# Patient Record
Sex: FEMALE | Race: WHITE | Employment: PART TIME | URBAN - METROPOLITAN AREA
[De-identification: names, ages, dates, MRNs, and addresses within clinical notes are randomized per-mention and may not be internally consistent; named-entity substitution may affect disease eponyms.]

---

## 2020-10-08 ENCOUNTER — HOSPITAL ENCOUNTER (OUTPATIENT)
Dept: LAB | Facility: MEDICAL CENTER | Age: 60
End: 2020-10-08
Attending: PHYSICIAN ASSISTANT
Payer: COMMERCIAL

## 2020-10-08 LAB
COVID ORDER STATUS COVID19: NORMAL
SARS-COV-2 RNA RESP QL NAA+PROBE: NOTDETECTED
SPECIMEN SOURCE: NORMAL

## 2020-10-08 PROCEDURE — C9803 HOPD COVID-19 SPEC COLLECT: HCPCS

## 2020-10-08 PROCEDURE — U0003 INFECTIOUS AGENT DETECTION BY NUCLEIC ACID (DNA OR RNA); SEVERE ACUTE RESPIRATORY SYNDROME CORONAVIRUS 2 (SARS-COV-2) (CORONAVIRUS DISEASE [COVID-19]), AMPLIFIED PROBE TECHNIQUE, MAKING USE OF HIGH THROUGHPUT TECHNOLOGIES AS DESCRIBED BY CMS-2020-01-R: HCPCS

## 2020-11-11 ENCOUNTER — HOSPITAL ENCOUNTER (OUTPATIENT)
Dept: LAB | Facility: MEDICAL CENTER | Age: 60
End: 2020-11-11
Attending: FAMILY MEDICINE
Payer: COMMERCIAL

## 2020-11-11 PROCEDURE — U0003 INFECTIOUS AGENT DETECTION BY NUCLEIC ACID (DNA OR RNA); SEVERE ACUTE RESPIRATORY SYNDROME CORONAVIRUS 2 (SARS-COV-2) (CORONAVIRUS DISEASE [COVID-19]), AMPLIFIED PROBE TECHNIQUE, MAKING USE OF HIGH THROUGHPUT TECHNOLOGIES AS DESCRIBED BY CMS-2020-01-R: HCPCS

## 2020-11-11 PROCEDURE — C9803 HOPD COVID-19 SPEC COLLECT: HCPCS

## 2020-11-12 LAB — COVID ORDER STATUS COVID19: NORMAL

## 2020-11-13 LAB
SARS-COV-2 RNA RESP QL NAA+PROBE: NOTDETECTED
SPECIMEN SOURCE: NORMAL

## 2020-12-18 ENCOUNTER — HOSPITAL ENCOUNTER (OUTPATIENT)
Dept: LAB | Facility: MEDICAL CENTER | Age: 60
End: 2020-12-18
Attending: FAMILY MEDICINE
Payer: COMMERCIAL

## 2020-12-18 PROCEDURE — U0003 INFECTIOUS AGENT DETECTION BY NUCLEIC ACID (DNA OR RNA); SEVERE ACUTE RESPIRATORY SYNDROME CORONAVIRUS 2 (SARS-COV-2) (CORONAVIRUS DISEASE [COVID-19]), AMPLIFIED PROBE TECHNIQUE, MAKING USE OF HIGH THROUGHPUT TECHNOLOGIES AS DESCRIBED BY CMS-2020-01-R: HCPCS

## 2020-12-18 PROCEDURE — C9803 HOPD COVID-19 SPEC COLLECT: HCPCS

## 2021-05-03 ENCOUNTER — HOSPITAL ENCOUNTER (OUTPATIENT)
Dept: RADIOLOGY | Facility: MEDICAL CENTER | Age: 61
End: 2021-05-03
Attending: FAMILY MEDICINE
Payer: COMMERCIAL

## 2021-05-03 DIAGNOSIS — M79.661 PAIN OF RIGHT LOWER LEG: ICD-10-CM

## 2021-05-03 DIAGNOSIS — R22.41 KNEE MASS, RIGHT: ICD-10-CM

## 2021-05-03 PROCEDURE — 93971 EXTREMITY STUDY: CPT | Mod: RT

## 2021-05-17 ENCOUNTER — HOSPITAL ENCOUNTER (OUTPATIENT)
Dept: LAB | Facility: MEDICAL CENTER | Age: 61
End: 2021-05-17
Attending: FAMILY MEDICINE
Payer: COMMERCIAL

## 2021-05-17 LAB
ALBUMIN SERPL BCP-MCNC: 4.3 G/DL (ref 3.2–4.9)
ALBUMIN/GLOB SERPL: 1.2 G/DL
ALP SERPL-CCNC: 113 U/L (ref 30–99)
ALT SERPL-CCNC: 18 U/L (ref 2–50)
ANION GAP SERPL CALC-SCNC: 9 MMOL/L (ref 7–16)
APPEARANCE UR: CLEAR
AST SERPL-CCNC: 20 U/L (ref 12–45)
BASOPHILS # BLD AUTO: 0.9 % (ref 0–1.8)
BASOPHILS # BLD: 0.07 K/UL (ref 0–0.12)
BILIRUB SERPL-MCNC: 0.3 MG/DL (ref 0.1–1.5)
BILIRUB UR QL STRIP.AUTO: NEGATIVE
BUN SERPL-MCNC: 12 MG/DL (ref 8–22)
CALCIUM SERPL-MCNC: 10 MG/DL (ref 8.5–10.5)
CHLORIDE SERPL-SCNC: 106 MMOL/L (ref 96–112)
CHOLEST SERPL-MCNC: 186 MG/DL (ref 100–199)
CO2 SERPL-SCNC: 27 MMOL/L (ref 20–33)
COLOR UR: YELLOW
CREAT SERPL-MCNC: 0.84 MG/DL (ref 0.5–1.4)
EOSINOPHIL # BLD AUTO: 0.19 K/UL (ref 0–0.51)
EOSINOPHIL NFR BLD: 2.3 % (ref 0–6.9)
ERYTHROCYTE [DISTWIDTH] IN BLOOD BY AUTOMATED COUNT: 44.2 FL (ref 35.9–50)
EST. AVERAGE GLUCOSE BLD GHB EST-MCNC: 128 MG/DL
FASTING STATUS PATIENT QL REPORTED: NORMAL
GLOBULIN SER CALC-MCNC: 3.5 G/DL (ref 1.9–3.5)
GLUCOSE SERPL-MCNC: 108 MG/DL (ref 65–99)
GLUCOSE UR STRIP.AUTO-MCNC: NEGATIVE MG/DL
HBA1C MFR BLD: 6.1 % (ref 4–5.6)
HCT VFR BLD AUTO: 48.1 % (ref 37–47)
HDLC SERPL-MCNC: 59 MG/DL
HGB BLD-MCNC: 15.6 G/DL (ref 12–16)
IMM GRANULOCYTES # BLD AUTO: 0.02 K/UL (ref 0–0.11)
IMM GRANULOCYTES NFR BLD AUTO: 0.2 % (ref 0–0.9)
KETONES UR STRIP.AUTO-MCNC: NEGATIVE MG/DL
LDLC SERPL CALC-MCNC: 106 MG/DL
LEUKOCYTE ESTERASE UR QL STRIP.AUTO: NEGATIVE
LYMPHOCYTES # BLD AUTO: 2.04 K/UL (ref 1–4.8)
LYMPHOCYTES NFR BLD: 24.9 % (ref 22–41)
MAGNESIUM SERPL-MCNC: 2.3 MG/DL (ref 1.5–2.5)
MCH RBC QN AUTO: 29.7 PG (ref 27–33)
MCHC RBC AUTO-ENTMCNC: 32.4 G/DL (ref 33.6–35)
MCV RBC AUTO: 91.6 FL (ref 81.4–97.8)
MICRO URNS: NORMAL
MONOCYTES # BLD AUTO: 0.49 K/UL (ref 0–0.85)
MONOCYTES NFR BLD AUTO: 6 % (ref 0–13.4)
NEUTROPHILS # BLD AUTO: 5.37 K/UL (ref 2–7.15)
NEUTROPHILS NFR BLD: 65.7 % (ref 44–72)
NITRITE UR QL STRIP.AUTO: NEGATIVE
NRBC # BLD AUTO: 0 K/UL
NRBC BLD-RTO: 0 /100 WBC
PH UR STRIP.AUTO: 5.5 [PH] (ref 5–8)
PLATELET # BLD AUTO: 292 K/UL (ref 164–446)
PMV BLD AUTO: 10.2 FL (ref 9–12.9)
POTASSIUM SERPL-SCNC: 4.4 MMOL/L (ref 3.6–5.5)
PROT SERPL-MCNC: 7.8 G/DL (ref 6–8.2)
PROT UR QL STRIP: NEGATIVE MG/DL
RBC # BLD AUTO: 5.25 M/UL (ref 4.2–5.4)
RBC UR QL AUTO: NEGATIVE
SODIUM SERPL-SCNC: 142 MMOL/L (ref 135–145)
SP GR UR STRIP.AUTO: 1.02
T3FREE SERPL-MCNC: 3.21 PG/ML (ref 2–4.4)
T4 FREE SERPL-MCNC: 1.15 NG/DL (ref 0.93–1.7)
TRIGL SERPL-MCNC: 107 MG/DL (ref 0–149)
TSH SERPL DL<=0.005 MIU/L-ACNC: 2.55 UIU/ML (ref 0.38–5.33)
UROBILINOGEN UR STRIP.AUTO-MCNC: 0.2 MG/DL
WBC # BLD AUTO: 8.2 K/UL (ref 4.8–10.8)

## 2021-05-17 PROCEDURE — 36415 COLL VENOUS BLD VENIPUNCTURE: CPT

## 2021-05-17 PROCEDURE — 80053 COMPREHEN METABOLIC PANEL: CPT

## 2021-05-17 PROCEDURE — 80061 LIPID PANEL: CPT

## 2021-05-17 PROCEDURE — 85025 COMPLETE CBC W/AUTO DIFF WBC: CPT

## 2021-05-17 PROCEDURE — 84439 ASSAY OF FREE THYROXINE: CPT

## 2021-05-17 PROCEDURE — 83036 HEMOGLOBIN GLYCOSYLATED A1C: CPT

## 2021-05-17 PROCEDURE — 81003 URINALYSIS AUTO W/O SCOPE: CPT

## 2021-05-17 PROCEDURE — 84443 ASSAY THYROID STIM HORMONE: CPT

## 2021-05-17 PROCEDURE — 84481 FREE ASSAY (FT-3): CPT

## 2021-05-17 PROCEDURE — 83735 ASSAY OF MAGNESIUM: CPT

## 2021-09-23 ENCOUNTER — HOSPITAL ENCOUNTER (OUTPATIENT)
Dept: LAB | Facility: MEDICAL CENTER | Age: 61
End: 2021-09-23
Attending: FAMILY MEDICINE
Payer: COMMERCIAL

## 2021-09-23 LAB — COVID ORDER STATUS COVID19: NORMAL

## 2021-09-23 PROCEDURE — C9803 HOPD COVID-19 SPEC COLLECT: HCPCS

## 2021-09-23 PROCEDURE — U0005 INFEC AGEN DETEC AMPLI PROBE: HCPCS

## 2021-09-23 PROCEDURE — U0003 INFECTIOUS AGENT DETECTION BY NUCLEIC ACID (DNA OR RNA); SEVERE ACUTE RESPIRATORY SYNDROME CORONAVIRUS 2 (SARS-COV-2) (CORONAVIRUS DISEASE [COVID-19]), AMPLIFIED PROBE TECHNIQUE, MAKING USE OF HIGH THROUGHPUT TECHNOLOGIES AS DESCRIBED BY CMS-2020-01-R: HCPCS

## 2021-09-24 LAB
SARS-COV-2 RNA RESP QL NAA+PROBE: DETECTED
SPECIMEN SOURCE: ABNORMAL

## 2022-07-05 ENCOUNTER — PATIENT MESSAGE (OUTPATIENT)
Dept: HEALTH INFORMATION MANAGEMENT | Facility: OTHER | Age: 62
End: 2022-07-05

## 2022-10-21 ENCOUNTER — OFFICE VISIT (OUTPATIENT)
Dept: URGENT CARE | Facility: CLINIC | Age: 62
End: 2022-10-21
Payer: COMMERCIAL

## 2022-10-21 VITALS
DIASTOLIC BLOOD PRESSURE: 82 MMHG | RESPIRATION RATE: 14 BRPM | WEIGHT: 183.1 LBS | OXYGEN SATURATION: 96 % | BODY MASS INDEX: 34.57 KG/M2 | TEMPERATURE: 97.4 F | SYSTOLIC BLOOD PRESSURE: 116 MMHG | HEIGHT: 61 IN | HEART RATE: 108 BPM

## 2022-10-21 DIAGNOSIS — R05.1 ACUTE COUGH: ICD-10-CM

## 2022-10-21 DIAGNOSIS — J01.10 ACUTE FRONTAL SINUSITIS, RECURRENCE NOT SPECIFIED: ICD-10-CM

## 2022-10-21 PROCEDURE — 99204 OFFICE O/P NEW MOD 45 MIN: CPT | Performed by: NURSE PRACTITIONER

## 2022-10-21 RX ORDER — DOXYCYCLINE HYCLATE 100 MG
100 TABLET ORAL 2 TIMES DAILY
Qty: 14 TABLET | Refills: 0 | Status: SHIPPED | OUTPATIENT
Start: 2022-10-21 | End: 2022-10-28

## 2022-10-21 RX ORDER — DOXYCYCLINE HYCLATE 20 MG
20 TABLET ORAL 2 TIMES DAILY
COMMUNITY
Start: 2022-08-23 | End: 2022-10-21

## 2022-10-21 RX ORDER — BENZONATATE 100 MG/1
100 CAPSULE ORAL 3 TIMES DAILY PRN
Qty: 60 CAPSULE | Refills: 0 | Status: SHIPPED | OUTPATIENT
Start: 2022-10-21 | End: 2024-01-11

## 2022-10-21 ASSESSMENT — FIBROSIS 4 INDEX: FIB4 SCORE: 1.03

## 2022-10-21 NOTE — PROGRESS NOTES
Chief Complaint   Patient presents with    Cough     X 3 weeks, coughing, SOB, sore throat, congestion, runny nose, rt ear pain       HISTORY OF PRESENT ILLNESS: Patient is a pleasant 62 y.o. female who presents today due to three weeks of nasal congestion, cough, headache, and sinus pressure.  She reports right ear pain today and also notes initial fever which is since cleared.  She denies associated difficulty breathing, confusion, nausea, vomiting or diarrhea.  She has tried OTC cold/sinus medication at home without much improvement.  Admits a history of sinus infections in the past, this feels similar.  She took 2 weeks of a cephalosporin with some improvement but not full resolve.      There are no problems to display for this patient.      Allergies:Aspirin, Epinephrine, and Penicillin g    Current Outpatient Medications Ordered in Epic   Medication Sig Dispense Refill    benzonatate (TESSALON) 100 MG Cap Take 1 Capsule by mouth 3 times a day as needed for Cough. 60 Capsule 0    doxycycline (VIBRAMYCIN) 100 MG Tab Take 1 Tablet by mouth 2 times a day for 7 days. 14 Tablet 0     No current Epic-ordered facility-administered medications on file.       History reviewed. No pertinent past medical history.    Social History     Tobacco Use    Smoking status: Never    Smokeless tobacco: Never   Vaping Use    Vaping Use: Never used   Substance Use Topics    Alcohol use: Not Currently     Comment: occ    Drug use: Never       No family status information on file.   History reviewed. No pertinent family history.    ROS:  Review of Systems   Constitutional: Positive for initial fever, chills, fatigue. Negative for weight loss.   HENT: Positive for sinus pressure, sore throat, nasal congestion.  Positive for right ear pain today.  Negative for nosebleeds, neck pain.    Eyes: Negative for vision changes.   Neuro: Positive for headache. Negative for sensory changes, weakness, seizure, LOC.  Cardiovascular: Negative for  "chest pain, palpitations, orthopnea and leg swelling.   Respiratory: Positive for cough, sputum production.   Negative for shortness of breath and wheezing.   Gastrointestinal: Negative for abdominal pain, nausea, vomiting or diarrhea.    Skin: Negative for rash, diaphoresis.     Exam:  /82 (BP Location: Left arm, Patient Position: Sitting, BP Cuff Size: Large adult long)   Pulse (!) 108   Temp 36.3 °C (97.4 °F) (Temporal)   Resp 14   Ht 1.549 m (5' 1\")   Wt 83.1 kg (183 lb 1.6 oz)   SpO2 96%   General: well-nourished, well-developed female in NAD  Head: normocephalic, atraumatic  Eyes: PERRLA, no conjunctival injection, acuity grossly intact, lids normal.  Ears: normal shape and symmetry, no tenderness, no discharge. External canals are without any significant edema or erythema. Tympanic membranes are without any inflammation, no effusion.  Scant amount of cerumen impaction noted right side.  Gross auditory acuity is intact.  Nose: symmetrical without tenderness, erythema and swelling noted bilateral turbinates, clear discharge.  Frontal sinus tenderness.   Mouth/Throat: reasonable hygiene, no exudates or tonsillar enlargement. Erythema is present.   Neck: no masses, range of motion within normal limits, no tracheal deviation. No obvious thyroid enlargement.   Lymph: no cervical adenopathy. No supraclavicular adenopathy.   Neuro: alert and oriented. Cranial nerves 1-12 grossly intact. No sensory deficit.   Cardiovascular: regular rate and rhythm. No edema.  Pulmonary: no distress. Chest is symmetrical with respiration, no wheezes, crackles, or rhonchi.   Musculoskeletal: no clubbing, appropriate muscle tone, gait is stable.  Skin: warm, dry, intact, no clubbing, no cyanosis, no rashes.   Psych: appropriate mood, affect, judgement.         Assessment/Plan:  1. Acute frontal sinusitis, recurrence not specified  doxycycline (VIBRAMYCIN) 100 MG Tab      2. Acute cough  benzonatate (TESSALON) 100 MG Cap    "           Doxy as directed for suspected sinusitis, questionable recurrent, potential side effects of medication discussed. Flonase as directed.  Tessalon as needed for cough.  Sleep with HOB elevated, humidifier at night, rest, increase fluid intake.   Supportive care, differential diagnoses, and indications for immediate follow-up discussed with patient.   Pathogenesis of diagnosis discussed including typical length and natural progression.   Instructed to return to clinic or nearest emergency department for any change in condition, further concerns, or worsening of symptoms.  Patient states understanding of the plan of care and discharge instructions.  Instructed to make an appointment, for follow up, with her primary care provider.        Please note that this dictation was created using voice recognition software. I have made every reasonable attempt to correct obvious errors, but I expect that there are errors of grammar and possibly content that I did not discover before finalizing the note.       KATHY Mckee.

## 2022-11-18 ENCOUNTER — OFFICE VISIT (OUTPATIENT)
Dept: URGENT CARE | Facility: CLINIC | Age: 62
End: 2022-11-18
Payer: COMMERCIAL

## 2022-11-18 VITALS
WEIGHT: 184 LBS | DIASTOLIC BLOOD PRESSURE: 86 MMHG | HEIGHT: 61 IN | SYSTOLIC BLOOD PRESSURE: 124 MMHG | TEMPERATURE: 98 F | RESPIRATION RATE: 16 BRPM | OXYGEN SATURATION: 100 % | HEART RATE: 79 BPM | BODY MASS INDEX: 34.74 KG/M2

## 2022-11-18 DIAGNOSIS — N30.00 ACUTE CYSTITIS WITHOUT HEMATURIA: ICD-10-CM

## 2022-11-18 LAB
APPEARANCE UR: NORMAL
BILIRUB UR STRIP-MCNC: NEGATIVE MG/DL
COLOR UR AUTO: YELLOW
GLUCOSE UR STRIP.AUTO-MCNC: NEGATIVE MG/DL
KETONES UR STRIP.AUTO-MCNC: NEGATIVE MG/DL
LEUKOCYTE ESTERASE UR QL STRIP.AUTO: NORMAL
NITRITE UR QL STRIP.AUTO: NEGATIVE
PH UR STRIP.AUTO: 5.5 [PH] (ref 5–8)
PROT UR QL STRIP: NEGATIVE MG/DL
RBC UR QL AUTO: NEGATIVE
SP GR UR STRIP.AUTO: 1.03
UROBILINOGEN UR STRIP-MCNC: 0.2 MG/DL

## 2022-11-18 PROCEDURE — 99213 OFFICE O/P EST LOW 20 MIN: CPT | Performed by: NURSE PRACTITIONER

## 2022-11-18 PROCEDURE — 81002 URINALYSIS NONAUTO W/O SCOPE: CPT | Performed by: NURSE PRACTITIONER

## 2022-11-18 RX ORDER — CEFDINIR 300 MG/1
300 CAPSULE ORAL 2 TIMES DAILY
Qty: 14 CAPSULE | Refills: 0 | Status: SHIPPED | OUTPATIENT
Start: 2022-11-18 | End: 2022-11-25

## 2022-11-18 ASSESSMENT — FIBROSIS 4 INDEX: FIB4 SCORE: 1.03

## 2022-11-18 NOTE — LETTER
November 18, 2022    To Whom It May Concern:         This is confirmation that Jazmin Schroeder attended her scheduled appointment with JOÃO Norman on 11/18/22. Please allow frequent restroom breaks.          If you have any questions please do not hesitate to call me at the phone number listed below.    Sincerely,          KATHY Norman.  [phone]

## 2022-11-19 NOTE — PROGRESS NOTES
Patient has consented to treatment and for use of patient information for treatment and billing purposes.    Date: 11/18/22     Arrival Mode: Private Vehicle    Chief Complaint:    Chief Complaint   Patient presents with    UTI     Bladder pressure/frequently urination/lower back pain/burning sensation/discomfort/x3days        History of Present Illness: 62 y.o.  female presents to clinic with 3-day history of bladder pressure urinary frequency urgency and burning with urination.  Patient also states she has been having low back pain as well.  Patient denies any vaginal symptoms possibly STI STD.  Patient does sit for long periods of time at work and is unable to hydrate and use the bathroom often.      ROS:    As stated in HPI     Pertinent Medical History:  History reviewed. No pertinent past medical history.     Pertinent Surgical History:  History reviewed. No pertinent surgical history.     Pertinent Medications:    Current Outpatient Medications on File Prior to Visit   Medication Sig Dispense Refill    benzonatate (TESSALON) 100 MG Cap Take 1 Capsule by mouth 3 times a day as needed for Cough. (Patient not taking: Reported on 11/18/2022) 60 Capsule 0     No current facility-administered medications on file prior to visit.        Allergies:    Aspirin, Epinephrine, and Penicillin g     Social History:  Social History     Tobacco Use    Smoking status: Never    Smokeless tobacco: Never   Vaping Use    Vaping Use: Never used   Substance Use Topics    Alcohol use: Not Currently     Comment: occ    Drug use: Never        No LMP recorded. (Menstrual status: Other).           Physical Exam:    Vitals:    11/18/22 1551   BP: 124/86   Pulse: 79   Resp: 16   Temp: 36.7 °C (98 °F)   SpO2: 100%             Physical Exam  Constitutional:       General: She is not in acute distress.     Appearance: Normal appearance. She is well-developed. She is not ill-appearing or toxic-appearing.   HENT:      Head: Normocephalic and  atraumatic.   Cardiovascular:      Rate and Rhythm: Normal rate and regular rhythm.      Heart sounds: Normal heart sounds.   Pulmonary:      Effort: Pulmonary effort is normal. No respiratory distress.      Breath sounds: Normal breath sounds. No wheezing.   Abdominal:      General: Abdomen is flat. Bowel sounds are normal.      Palpations: Abdomen is soft.      Tenderness: There is no abdominal tenderness. There is no right CVA tenderness, left CVA tenderness, guarding or rebound.   Skin:     General: Skin is warm.      Capillary Refill: Capillary refill takes less than 2 seconds.      Coloration: Skin is not cyanotic or pale.   Neurological:      Mental Status: She is alert and oriented to person, place, and time.      Gait: Gait is intact.   Psychiatric:         Behavior: Behavior normal. Behavior is cooperative.                Diagnostics:    Recent Results (from the past 24 hour(s))   POCT Urinalysis    Collection Time: 11/18/22  4:04 PM   Result Value Ref Range    POC Color yellow Negative    POC Appearance cler Negative    POC Leukocyte Esterase small Negative    POC Nitrites negative Negative    POC Urobiligen 0.2 Negative (0.2) mg/dL    POC Protein negative Negative mg/dL    POC Urine PH 5.5 5.0 - 8.0    POC Blood negative Negative    POC Specific Gravity 1.030 <1.005 - >1.030    POC Ketones negative Negative mg/dL    POC Bilirubin negative Negative mg/dL    POC Glucose negative Negative mg/dL        Medical Decision making and clinic course :  I personally reviewed prior external notes and test results pertinent to today's visit.   Shared decision-making was utilized with patient for treatment plan.  POCT urinalysis shows small leukocytes as well as a high specific gravity.  Did encourage rehydration.  We will treat with Omnicef at this time patient is allergic to penicillins although has tolerated cephalosporins in the past.  If symptoms or not improved within the next 2 days while on antibiotics return  to clinic for reevaluation.        The patient remained stable during the urgent care visit.    Plan:    Medication discussed included indication for use and the potential benefits and side effects.         1. Acute cystitis without hematuria    - cefdinir (OMNICEF) 300 MG Cap; Take 1 Capsule by mouth 2 times a day for 7 days.  Dispense: 14 Capsule; Refill: 0  - POCT Urinalysis         Printed education was provided regarding the aforementioned assessments.  All of the patient's questions were answered to their satisfaction at the time of discharge.    Follow up:      Patient was encouraged to monitor symptoms closely. Those signs and symptoms which would warrant concern and mandate seeking a higher level of service through the emergency department discussed at length and included in discharge papers.  Patient stated agreement and understanding of this plan of care.    Disposition:  Home in stable condition       Voice Recognition Disclaimer:  Portions of this document were created using voice recognition software. The software does have a chance of producing errors of grammar and possibly content. I have made every reasonable attempt to correct obvious errors, but there may be errors of grammar and possibly content that I did not discover before finalizing the documentation.    Dory Reese, A.P.R.N.

## 2023-01-10 ENCOUNTER — TELEPHONE (OUTPATIENT)
Dept: HEALTH INFORMATION MANAGEMENT | Facility: OTHER | Age: 63
End: 2023-01-10
Payer: COMMERCIAL

## 2023-02-16 ENCOUNTER — TELEPHONE (OUTPATIENT)
Dept: VASCULAR SURGERY | Facility: MEDICAL CENTER | Age: 63
End: 2023-02-16
Payer: MEDICARE

## 2024-01-11 ENCOUNTER — OFFICE VISIT (OUTPATIENT)
Dept: URGENT CARE | Facility: CLINIC | Age: 64
End: 2024-01-11
Payer: MEDICARE

## 2024-01-11 VITALS
TEMPERATURE: 98.1 F | WEIGHT: 178.1 LBS | BODY MASS INDEX: 33.62 KG/M2 | DIASTOLIC BLOOD PRESSURE: 82 MMHG | SYSTOLIC BLOOD PRESSURE: 122 MMHG | HEIGHT: 61 IN | HEART RATE: 90 BPM | RESPIRATION RATE: 18 BRPM | OXYGEN SATURATION: 98 %

## 2024-01-11 DIAGNOSIS — J02.9 ACUTE VIRAL PHARYNGITIS: ICD-10-CM

## 2024-01-11 DIAGNOSIS — J02.9 SORE THROAT: ICD-10-CM

## 2024-01-11 LAB — S PYO DNA SPEC NAA+PROBE: NOT DETECTED

## 2024-01-11 PROCEDURE — 87651 STREP A DNA AMP PROBE: CPT | Performed by: PHYSICIAN ASSISTANT

## 2024-01-11 PROCEDURE — 3074F SYST BP LT 130 MM HG: CPT | Performed by: PHYSICIAN ASSISTANT

## 2024-01-11 PROCEDURE — 99213 OFFICE O/P EST LOW 20 MIN: CPT | Performed by: PHYSICIAN ASSISTANT

## 2024-01-11 PROCEDURE — 3079F DIAST BP 80-89 MM HG: CPT | Performed by: PHYSICIAN ASSISTANT

## 2024-01-11 ASSESSMENT — ENCOUNTER SYMPTOMS
HOARSE VOICE: 0
ABDOMINAL PAIN: 0
HEADACHES: 1
VOMITING: 0
RESPIRATORY NEGATIVE: 1
SORE THROAT: 1
CHILLS: 0
DIARRHEA: 0
STRIDOR: 0
DIZZINESS: 0
FEVER: 0
SHORTNESS OF BREATH: 0
SWOLLEN GLANDS: 1
TROUBLE SWALLOWING: 1
NAUSEA: 0
SINUS PAIN: 0
MUSCULOSKELETAL NEGATIVE: 1
COUGH: 0
CARDIOVASCULAR NEGATIVE: 1

## 2024-01-11 NOTE — PROGRESS NOTES
Subjective     Jazmin Schroeder is a very pleasant 64 y.o. female who presents with Pharyngitis (X1day headache/nasal congestion/right ear pain/)            Pharyngitis   This is a new problem. The current episode started in the past 7 days. The problem has been gradually worsening. There has been no fever. The fever has been present for Less than 1 day. Associated symptoms include ear pain, headaches, swollen glands and trouble swallowing. Pertinent negatives include no abdominal pain, congestion, coughing, diarrhea, hoarse voice, shortness of breath, stridor or vomiting. She has had exposure to strep. She has tried NSAIDs for the symptoms. The treatment provided mild relief.       PMH:  has no past medical history on file.  MEDS: No current outpatient medications on file.  ALLERGIES:   Allergies   Allergen Reactions    Aspirin Itching    Dexamethasone Swelling     Swelling/    Epinephrine Anaphylaxis    Penicillin G Shortness of Breath and Palpitations     SURGHX: No past surgical history on file.  SOCHX:  reports that she has never smoked. She has never used smokeless tobacco. She reports current alcohol use. She reports that she does not use drugs.  FH: family history is not on file.      Review of Systems   Constitutional:  Positive for malaise/fatigue. Negative for chills and fever.   HENT:  Positive for ear pain, sore throat and trouble swallowing. Negative for congestion, hoarse voice and sinus pain.    Respiratory: Negative.  Negative for cough, shortness of breath and stridor.    Cardiovascular: Negative.    Gastrointestinal:  Negative for abdominal pain, diarrhea, nausea and vomiting.   Musculoskeletal: Negative.    Neurological:  Positive for headaches. Negative for dizziness.       Medications, Allergies, and current problem list reviewed today in Epic           Objective     /82 (BP Location: Left arm, Patient Position: Sitting)   Pulse 90   Temp 36.7 °C (98.1 °F) (Temporal)   Resp 18   Ht  "1.549 m (5' 1\")   Wt 80.8 kg (178 lb 1.6 oz)   SpO2 98%   BMI 33.65 kg/m²      Physical Exam  Vitals and nursing note reviewed.   Constitutional:       General: She is not in acute distress.     Appearance: Normal appearance. She is well-developed. She is not ill-appearing, toxic-appearing or diaphoretic.   HENT:      Head: Normocephalic and atraumatic.      Right Ear: Tympanic membrane, ear canal and external ear normal.      Left Ear: Tympanic membrane, ear canal and external ear normal.      Nose: Rhinorrhea present. No congestion.      Mouth/Throat:      Mouth: Mucous membranes are moist.      Pharynx: Posterior oropharyngeal erythema present. No oropharyngeal exudate.   Eyes:      General:         Right eye: No discharge.         Left eye: No discharge.      Conjunctiva/sclera: Conjunctivae normal.   Cardiovascular:      Rate and Rhythm: Normal rate and regular rhythm.      Pulses: Normal pulses.      Heart sounds: Normal heart sounds.   Pulmonary:      Effort: Pulmonary effort is normal. No respiratory distress.      Breath sounds: Normal breath sounds. No stridor. No wheezing, rhonchi or rales.   Musculoskeletal:      Cervical back: Normal range of motion and neck supple. No tenderness.      Right lower leg: No edema.      Left lower leg: No edema.   Lymphadenopathy:      Cervical: No cervical adenopathy.   Skin:     General: Skin is warm and dry.   Neurological:      General: No focal deficit present.      Mental Status: She is alert and oriented to person, place, and time. Mental status is at baseline.   Psychiatric:         Mood and Affect: Mood normal.         Behavior: Behavior normal.         Thought Content: Thought content normal.         Judgment: Judgment normal.                             Assessment & Plan     This is a very pleasant 64-year-old female presenting with headache, ear fullness, sore throat and rhinorrhea for 1 day.  Exposure to strep at home.  No fever, chills or bodyaches.  No " shortness of breath, chest pain, wheezing or leg swelling.  Eating and drinking normal without vomiting or diarrhea.  Patient has dental surgery planned for Monday and needs to rule out treatable infectious etiology including strep.  No pertinent past respiratory history.  Vital signs are normal.  Clear rhinorrhea with pharynx erythema noted.  No tonsillar enlargement, exudate or adenopathy noted.  Lungs are clear without wheezing, rhonchi, rales or stridor.  Remainder of exam benign/reassuring.  In clinic strep testing negative. No clinical symptoms/signs of focal bacterial infection.  Patient will be treated for self-limiting viral URI with OTC meds, conservative measures, and symptomatic relief.  ER and red flag symptoms discussed at length.  Patient declines prescription for symptomatic relief at this time, will treat with OTC meds      1. Sore throat  POCT CEPHEID GROUP A STREP - PCR      2. Acute viral pharyngitis            I personally reviewed prior external notes and test results pertinent to today's visit. Return to clinic or go to ED if symptoms worsen or persist. Red flag symptoms and indications for ED discussed at length. Patient/Parent/Guardian voices understanding.  AVS with post-visit instructions provided or given verbally.  Follow-up with your primary care provider in 3-5 days. All side effects and potential interactions of prescribed medication discussed including allergic response, GI upset, tendon injury, rash, sedation, OCP effectiveness, etc.    Please note that this dictation was created using voice recognition software. I have made every reasonable attempt to correct obvious errors, but I expect that there are errors of grammar and possibly content that I did not discover before finalizing the note.

## 2024-03-13 ENCOUNTER — OFFICE VISIT (OUTPATIENT)
Dept: URGENT CARE | Facility: CLINIC | Age: 64
End: 2024-03-13
Payer: MEDICARE

## 2024-03-13 VITALS
TEMPERATURE: 97 F | RESPIRATION RATE: 18 BRPM | DIASTOLIC BLOOD PRESSURE: 82 MMHG | OXYGEN SATURATION: 97 % | HEART RATE: 86 BPM | SYSTOLIC BLOOD PRESSURE: 130 MMHG

## 2024-03-13 DIAGNOSIS — R05.1 ACUTE COUGH: ICD-10-CM

## 2024-03-13 DIAGNOSIS — R09.81 NASAL CONGESTION: ICD-10-CM

## 2024-03-13 DIAGNOSIS — R06.2 WHEEZING: ICD-10-CM

## 2024-03-13 DIAGNOSIS — J32.9 RHINOSINUSITIS: ICD-10-CM

## 2024-03-13 LAB
FLUAV RNA SPEC QL NAA+PROBE: NEGATIVE
FLUBV RNA SPEC QL NAA+PROBE: NEGATIVE
RSV RNA SPEC QL NAA+PROBE: NEGATIVE
SARS-COV-2 RNA RESP QL NAA+PROBE: NEGATIVE

## 2024-03-13 PROCEDURE — 0241U POCT CEPHEID COV-2, FLU A/B, RSV - PCR: CPT

## 2024-03-13 PROCEDURE — 99213 OFFICE O/P EST LOW 20 MIN: CPT

## 2024-03-13 PROCEDURE — 3075F SYST BP GE 130 - 139MM HG: CPT

## 2024-03-13 PROCEDURE — 3079F DIAST BP 80-89 MM HG: CPT

## 2024-03-13 RX ORDER — ALBUTEROL SULFATE 90 UG/1
2 AEROSOL, METERED RESPIRATORY (INHALATION) EVERY 6 HOURS PRN
Qty: 8.5 G | Refills: 0 | Status: SHIPPED | OUTPATIENT
Start: 2024-03-13

## 2024-03-13 RX ORDER — DOXYCYCLINE HYCLATE 100 MG
100 TABLET ORAL 2 TIMES DAILY
Qty: 14 TABLET | Refills: 0 | Status: SHIPPED | OUTPATIENT
Start: 2024-03-13 | End: 2024-03-20

## 2024-03-13 ASSESSMENT — ENCOUNTER SYMPTOMS
CHILLS: 0
SINUS PAIN: 1
SHORTNESS OF BREATH: 0
COUGH: 1
SPUTUM PRODUCTION: 0
WHEEZING: 1
FEVER: 1

## 2024-03-13 NOTE — PROGRESS NOTES
Subjective:   Jazmin Schroeder is a 64 y.o. female who presents for Cough (Nasal and chest congestion, x 6 days )      HPI: This is a 64-year-old female who presents today for cough, congestion, ear itching, postnasal drip, and wheezing.  This is a new problem.  Patient reports that her symptoms developed 6 days ago.  She reports elevated temperatures with a Tmax of 100.  She has been taking Mucinex and ibuprofen.  She denies any underlying asthma or smoking.  She denies any known sick contacts.    Review of Systems   Constitutional:  Positive for fever. Negative for chills and malaise/fatigue.   HENT:  Positive for congestion and sinus pain.         + Postnasal drip   Respiratory:  Positive for cough and wheezing. Negative for sputum production and shortness of breath.        Medications:    No current outpatient medications on file prior to visit.     No current facility-administered medications on file prior to visit.        Allergies:   Aspirin, Dexamethasone, Epinephrine, and Penicillin g    Problem List:   There is no problem list on file for this patient.       Surgical History:  No past surgical history on file.    Past Social Hx:   Social History     Tobacco Use    Smoking status: Never    Smokeless tobacco: Never   Vaping Use    Vaping Use: Never used   Substance Use Topics    Alcohol use: Yes     Comment: occ    Drug use: Never          Problem list, medications, and allergies reviewed by myself today in Epic.     Objective:     /82   Pulse 86   Temp 36.1 °C (97 °F)   Resp 18   SpO2 97%     Physical Exam  Vitals and nursing note reviewed.   Constitutional:       General: She is not in acute distress.     Appearance: Normal appearance. She is normal weight. She is not ill-appearing, toxic-appearing or diaphoretic.   HENT:      Head: Normocephalic and atraumatic.   Cardiovascular:      Rate and Rhythm: Normal rate and regular rhythm.      Pulses: Normal pulses.      Heart sounds: Normal heart  sounds. No murmur heard.     No friction rub. No gallop.   Pulmonary:      Effort: Pulmonary effort is normal. No respiratory distress.      Breath sounds: No stridor. Wheezing present. No rhonchi or rales.      Comments: +mild wheezes  Chest:      Chest wall: No tenderness.   Musculoskeletal:      Cervical back: Neck supple. No tenderness.   Lymphadenopathy:      Cervical: No cervical adenopathy.   Skin:     General: Skin is warm and dry.      Capillary Refill: Capillary refill takes less than 2 seconds.   Neurological:      General: No focal deficit present.      Mental Status: She is alert and oriented to person, place, and time. Mental status is at baseline.      Cranial Nerves: No cranial nerve deficit.      Motor: No weakness.      Gait: Gait normal.   Psychiatric:         Mood and Affect: Mood normal.         Behavior: Behavior normal.         Thought Content: Thought content normal.         Judgment: Judgment normal.         Assessment/Plan:     Diagnosis and associated orders:   1. Nasal congestion  POCT CoV-2, Flu A/B, RSV by PCR      2. Acute cough        3. Rhinosinusitis  doxycycline (VIBRAMYCIN) 100 MG Tab      4. Wheezing  albuterol 108 (90 Base) MCG/ACT Aero Soln inhalation aerosol            Results for orders placed or performed in visit on 03/13/24   POCT CoV-2, Flu A/B, RSV by PCR   Result Value Ref Range    SARS-CoV-2 by PCR Negative Negative, Invalid    Influenza virus A RNA Negative Negative, Invalid    Influenza virus B, PCR Negative Negative, Invalid    RSV, PCR Negative Negative, Invalid       Comments/MDM:   Pt is clinically stable at today's acute urgent care visit.  No acute distress noted. Appropriate for outpatient management at this time.     Acute problem.  COVID, influenza, RSV are all negative.  Patient noted to have some slight wheezes on exam today.  Patient declines steroids today.  She will be treated empirically with course of doxycycline.  Butyryl inhaler also prescribed.  I  have recommended daily use of daily nondrowsy antihistamine such as Claritin or Zyrtec. Patient is to return to UC or go to ER for any new or worsening signs or symptoms, and follow with with PCP for recheck. Patient is agreeable with plan of care and verbalizes good understanding.             Discussed DDx, management options (risks,benefits, and alternatives to planned treatment), natural progression and supportive care.  Expressed understanding and the treatment plan was agreed upon. Questions were encouraged and answered   Return to urgent care prn if new or worsening sx or if there is no improvement in condition prn.    Educated in Red flags and indications to immediately call 911 or present to the Emergency Department.   Advised the patient to follow-up with the primary care physician for recheck, reevaluation, and consideration of further management.    I personally reviewed prior external notes and test results pertinent to today's visit.  I have independently reviewed and interpreted all diagnostics ordered during this urgent care acute visit.         Please note that this dictation was created using voice recognition software. I have made a reasonable attempt to correct obvious errors, but I expect that there are errors of grammar and possibly content that I did not discover before finalizing the note.    This note was electronically signed by MAGNOLIA Valadez

## 2024-03-25 ENCOUNTER — OFFICE VISIT (OUTPATIENT)
Dept: URGENT CARE | Facility: CLINIC | Age: 64
End: 2024-03-25
Payer: MEDICARE

## 2024-03-25 VITALS
WEIGHT: 181 LBS | TEMPERATURE: 97.5 F | HEART RATE: 84 BPM | SYSTOLIC BLOOD PRESSURE: 126 MMHG | RESPIRATION RATE: 18 BRPM | BODY MASS INDEX: 34.17 KG/M2 | OXYGEN SATURATION: 95 % | DIASTOLIC BLOOD PRESSURE: 80 MMHG | HEIGHT: 61 IN

## 2024-03-25 DIAGNOSIS — J01.90 ACUTE BACTERIAL RHINOSINUSITIS: ICD-10-CM

## 2024-03-25 DIAGNOSIS — B96.89 ACUTE BACTERIAL RHINOSINUSITIS: ICD-10-CM

## 2024-03-25 PROCEDURE — 3079F DIAST BP 80-89 MM HG: CPT

## 2024-03-25 PROCEDURE — 99213 OFFICE O/P EST LOW 20 MIN: CPT

## 2024-03-25 PROCEDURE — 3074F SYST BP LT 130 MM HG: CPT

## 2024-03-25 RX ORDER — CLINDAMYCIN HYDROCHLORIDE 300 MG/1
CAPSULE ORAL
COMMUNITY
Start: 2024-01-16 | End: 2024-03-25

## 2024-03-25 RX ORDER — CEFDINIR 300 MG/1
300 CAPSULE ORAL 2 TIMES DAILY
Qty: 20 CAPSULE | Refills: 0 | Status: SHIPPED | OUTPATIENT
Start: 2024-03-25 | End: 2024-04-04

## 2024-03-25 RX ORDER — DEXAMETHASONE 4 MG/1
TABLET ORAL
COMMUNITY
Start: 2024-01-04 | End: 2024-03-25

## 2024-03-25 RX ORDER — HYDROCODONE BITARTRATE AND ACETAMINOPHEN 5; 325 MG/1; MG/1
TABLET ORAL
COMMUNITY
Start: 2024-01-16

## 2024-03-25 ASSESSMENT — ENCOUNTER SYMPTOMS
SHORTNESS OF BREATH: 0
DIAPHORESIS: 0
SINUS PAIN: 1
COUGH: 0
HEADACHES: 0
NECK PAIN: 0
HOARSE VOICE: 0
SWOLLEN GLANDS: 0
CHILLS: 0
SINUS PRESSURE: 0
SORE THROAT: 0
FEVER: 0

## 2024-03-25 NOTE — PATIENT INSTRUCTIONS
"As we discussed your clinical condition would benefit from over-the-counter remedies:    Congestion:    Nasal rinsing with saline nasal spray or salt water (e.g., neti pot) can help relieve nasal dryness.    Breathe Right nasal strips at night for nasal congestion    Ponaris nasal emmollient for nasal congestion, dryness, and inflammation (do not use with iodine sensitivity)    Cool mist humidification, chest rubs, warm tea with honey, increased fluid intake to thin secretions    SALINE IRRIGATION/SPRAY 2 to 3 times per day    You may consider using a saline nasal irrigation (such as a \"Neti pot\" or similar device using sterile water, NOT tap water) or OTC saline nasal spray. Common brands include Gibran Med, Sinex, [place] Nasal. May use short term nasal sprays, such as oxymetazoline (Afrin) to help relieve nasal discomfort, congestion, and/or pressure. Decongestant sprays should not be used longer than three consecutive days.     Over the counter medications:    OTC second generation antihistamine daily (cetirizine, desloratadine, fexofenadine, levocetirizine, and loratadine) daily IN COMBINATION WITH:    FLONASE (once per day) x 2 weeks     You may consider intranasal steroids such as fluticasone (brand name Flonase), (other options include Nasonex, Rhinocort, Nasacort) daily; continue for at least 2-3 weeks. Any generic should work as well but may cause slightly more nasal irritation. Please take according to package directions.  This steroid will help reduce inflammation of your sinuses.  This is the number one medication to help with seasonal allergies and treat nasal inflammation.  Cost: around $8 at [facility] for the generic fluticasone [facility] product (as of 5/20).    SUDAFED (low dose to see if tolerated) daily x 4-5 days    You may consider over-the-counter Sudafed (pseudoephedrine) to act as a decongestant to improve your breathing through your nose.  Please do not use this medication if you already have known " high blood pressure.  Please take according to package directions and note that this has a stimulating effect and should not be taken late in the day.  There is a low dose 12 hour formulation and a higher dose 24 hour formulation.  Start with a low dose to make sure you tolerate the medication.  Do not take late in the day as it may interfere with sleep.   Cost: Around $6 for the generic [facility] branded product at a 10mg dose (as of 2/2023).      SORE THROAT:    Symptom management for a sore throat includes:     Sipping cold or warm beverages (eg, tea with honey or lemon)     Eating cold or frozen desserts (eg, ice cream, popsicles)    Sucking on ice    Sucking on hard candy, CEPACOL lozenges, or medicated throat sprays. These contain the active ingredient benzocaine which is an anesthetic agent.  It helps relieve the symptoms of sore throat and may diminish your cough reflex.Please note that taking too frequently may cause harm - pay attention to package directions.    Gargling with warm salt water -  ¼ to ½ teaspoon of salt per 8 ounces (approximately 240 mL) of warm water.  Cool mist humidification  OTC Tylenol/ibuprofen PRN for pain/fever     PAIN OR FEVER:    NSAIDs  You may take Ibuprofen (brand name Motrin or Advil) 600 to 800 mg may be taken up to three times per day taken with food (do not exceed 2400 mg per day).  If you prefer you may consider Naproxen (brand name Naprosyn or Aleve) around 500 mg twice per day with food (do not exceed 1200 mg per day). Please do not take if you have known stomach ulcers or known kidney disease.     TYLENOL  You may take Tylenol according to package directions (1000 mg every 8 hours not to exceed 3000 mg per day).  Please do not consume with any alcohol products, or if you have known or suspected liver disease. These will help reduce inflammation, help with pain control, and can reduce fevers.      NASAL & SINUS HYGIENE     This information should help you understand how  the nose and sinuses work so that you can maximize the beneficial effects and  minimize problems.    Function of the Nose:    A healthy nose is open on both sides. The three most important functions of your nose are to humidify the air you breathe, filter out  airborne particles (pollutants, pollen, etc), and warm the air to body temperature. It also lets you smell and taste the food you eat.  The lining of the nose and sinuses normally produce about 2 quarts of liquid mucus each day, which aids in keeping the entire  respiratory passage clean, warm, and moist. A conveyor belt of millions of tiny beating hairs called cilia move the mucus (along with  dirt particles, inactivated bacteria, and viruses) against gravity out of your sinuses and nose. It is then swept towards the back of the  throat, where the mucus with germs is swallowed and destroyed by your stomach acid.    Background Information:    When the vital functions of humidifying, filtering, and warming are stressed, the nose responds by swelling. This increases contact  time between the air and mucous. It also increases the amount of mucus produced.    The ideal humidity for your nose is 40-60% relative humidity. Your nose is responsible for humidifying the air you breathe to 100%  relative humidity for your lungs. Unfortunately, forced air heating during the winter really dries out this air. As a result, the drier the  air you breathe, the more nasal congestion occurs.    Excessive dryness inside the nose causes the delicate cilia to stop working and also makes the nose more susceptible to viruses. This is  common during the winter months, when many homes and offices are warmed by forced air heating. Many medications  (antihistamines, decongestants, diuretics, antidepressants, etc), caffeinated beverages (coffee, tea, cola, etc), and alcoholic beverages  result in dryness. When the nose is not properly functioning, excessive moisture is lost by mouth  "breathing. Sometimes it is thick  tenacious mucus that gives the sensation of \"excessive\" postnasal drip when, in fact, there is a problem with not enough secretions.  The nose filters a tremendous amount of airborne particles each day. Particles which trigger an allergic reaction in only a portion of the  population are called allergens (dust mites, grass, molds, trees, animal hair, etc). Other particles (cigarette smoke, pollution, dust, etc)  irritate everyone's nasal linings and therefore should be avoided. Nothing is more effective as removing the source of the problem.  Living with a problem nose can be frustrating, but you can make it easier by giving your nose proper care and avoiding unnecessary  irritation at home and at work. Fortunately, even troublesome noses can get back into working condition with good nasal hygiene.  This care is directed at promoting moisturization and normal clearance of excess mucus from nose and sinus linings.    Nasal Hygiene Suggestions:     Drink 8-10 glasses of water each day.   Avoid caffeinated and alcoholic beverages.   Use preservative free nasal moisturizers. Benzalkonium chloride (a commonly used nasal spray preservative) should be  avoided since it may cause rebound nasal swelling with prolonged use.   Avoid the certain medications which dry nasal membranes, such as decongestants.   Perform nasal saline irrigations twice a day or more (see Nasal Saline Irrigation handout).   Use mucous thinning agents (guaifenesin, plain Robitussin®) for thick mucus.   Keep the home thermostat at or below 65 degrees.   Use a humidifier or vaporizer (clean it regularly to prevent mold buildup).   Use a home air  with a high efficiency particulate filter and change disposable filters regularly   If you have a central heating/air-conditioning system with a humidifier and/or air , set the fan switch to the “On” position,  rather than the “Auto” position to improve filtration " and humidification.     Follow environmental control measures for allergies    Quit smoking and/or avoid tobacco smoke.   Exercise daily.   Eat a balanced diet with supplemental vitamins, especially Vitamin C.   Wash your hands regularly.   Avoid  (for children).   Sleep with your head elevated 30 degrees.   Breathe Right® nasal strips (improves nasal breathing when congested).   Antibiotics as prescribed for bacterial infections of the sinuses.   Steroid sprays as prescribed (see Nasal Steroid Spray handout).  Table 1: Nasal Moisturizers (Preservative free is preferable)  Nasal Moisturizers Brand names Instructions Distributor  Festus Sun/saline spray Pretz Spray® 2 sprays each nostril as often as needed. [phone]  Saline nasal spray Natru-Vent® 2 sprays each nostril as often as needed. N/A  Nasal saline gel Apply to inside of nostrils as often as needed. N/A  Nasal emollient (used by [employer]) Ponaris® Apply to inside of nostrils morning and night. [phone]  Petroleum jelly Vaseline® Apply to inside of nostrils morning and night. N/A      Dust and vacuum frequently. Cotton (not nylon) mop to gather (not spread) dust. Avoid dustcollecting interior furniture, nonsynthetic drapes, and shag rugs. Use high efficiency particulate air  filters on vacuum . Use air conditioner during peak pollen months.  Bedroom measures Remove all rugs, non-synthetic drapes, overstuffed furniture, dust-collecting books and toys. Use synthetic foam pillows. Cover box spring and mattress with plastic liners. Keep bedroom window  closed during allergy season. Remove pets (particularly cats).  Mold control Remove houseplant. Clean bathroom crevices and grout with mold-killing cleanser. Use mold killing  agents when able. Clean humidifiers regularly as recommended by the .     NASAL SALINE IRRIGATION    Your provider recommends that you irrigate your nose at least 2 or more times each day with this special  "solution. We also encourage  the use of over the counter nasal saline sprays (Ocean, Ayr, Natruvent, etc, but this does not substitute for the beneficial effects of this  high volume nasal irrigation as outlined below.    Proven Benefits    § Washes away allergens, dust, dirt, and pollens.  § Increases mucus flow out of the nose and clears sinus passages.  § Improves breathing by pulling fluid out of swollen mucous membranes.  § Reduces nasal swelling and other upper respiratory problems.  § Helps prevent sinus infection    Correct Nasal Wash Technique    § Wash your hands.  § Make the nasal wash solution.  § Do not use tap water for the nasal wash (unless boiled or filtered as described below). Do not use well-water.  § You may use:  o Distilled water,  o Sterilized water,  o Tap water that has been boiled for 1 minute (at elevations above 6,500 ft., boil for 3 minutes) and cooled or  o Tap water that is filtered using a filter with an absolute pore size of 1 micron or smaller.  § Whichever water you use to make the saline solution replace container or water at least weekly.  § To make the saltwater solution, mix one-half teaspoon uniodized (\"pickling/vandana\") salt in an 8-ounce glass of water  (described above). Uniodized salt is used because iodized salt may be irritating when used over a long period of time. Add a  pinch of baking soda. A pinch is a small amount you can  between two fingers. If you are congested, use the entire 8  ounces of saltwater during the nasal wash; otherwise, 4 ounces should be enough.  § The recipe for a larger quantity would include 1 to 1.5 heaping teaspoons of uniodized salt and 1 rounded teaspoon of baking  soda (pure bicarbonate) in one quart of water.  § Discard any unused saltwater and prepare a new saltwater solution before the next nasal wash.  Position for the Nasal Wash  § Adults and older children - Lean far over the sink with your head down.  § Younger children - If " possible, have your child lean as far over the sink as possible. A small child may have trouble  cooperating with a nasal wash and may need to be held and assisted. Ask your health care provider about ways to hold a  small child when doing a nasal wash.    Techniques for Adults and Older Children     Sinus Rinse Kit Technique (preferred technique) - The Sinus Rinse Kit comes with a Sinus Rinse bottle and mixture  packets. When using the Sinus Rinse Kit you can use the prepared mixture packets that come with the kit or you can make  your own nasal wash solution described above. The Sinus Rinse bottle is filled with saltwater. The bottle is placed against the  nostril. After the bottle is squeezed, saltwater comes out the opposite nostril and may come out the mouth. The nose is then  blown gently. The procedure is then repeated with the other nostril. Smaller sized bottles come in the Sinus Rinse Pediatric  Kit.     Bulb Syringe Technique (alternate technique) - Use a large all-rubber ear syringe. An ear bulb syringe can be purchased at  most pharmacies. Fill the syringe completely with the saltwater. Insert the syringe tip just inside your nostril and pinch your  nostril around the tip of the bulb syringe to keep the solution from running out your nose. Gently squeeze the bulb to swish  the solution around in your nose; then blow your nose lightly. Repeat the procedure with the other nostril.   Water Pik® Technique (alternate technique) - Use a Water Pik® with a Sinus  Tip. Pour the saltwater into the  water reservoir and set the Water Pik® at the lowest possible pressure. Insert the tip just inside your nostril and allow the  fluid to run out of your mouth or other nostril. Blow your nose lightly. Repeat the procedure with the other nostril.     Hand Technique (alternate technique) - Use your hands for this technique. Pour some saltwater into your palm. Sniff the  liquid up your nose, one nostril at a time.  Blow your nose lightly. This technique may not be as effective but may be used in  some situations.     Techniques For Babies - Babies - Use Nasadrops™ or a saline spray for doing a nasal wash with a baby. Place a small  amount of the saltwater in your baby's nostril. Use a bulb syringe to suction the mucus from your baby's nose. Repeat the  procedure with the other nostril. Ask your health care provider to show you how to do this.  § With any technique, the saltwater solution may get into the mouth during the nasal wash and leave a salty taste. You may  want to rinse the mouth with water after the nasal wash.    Cleaning the Equipment    § You must thoroughly clean the equipment used for a nasal wash to prevent the growth of bacteria. It is important for each  family member to have his/her own bulb syringe or nasal adaptor.    Cleaning the Sinus Rinse Bottle    § After each use put a small amount of dishwashing detergent in the bottle. Add water (described above). Secure the cap with  the tube onto the bottle. Shake the bottle. Rinse the bottle, tubing and cap with water. Shake off any excess water and allow  the pieces to dry on a clean towel.  § If you feel the system is discolored or contaminated clean the bottle, cap and tubing with rubbing (70 percent isopropyl)  alcohol or white, distilled vinegar (1 part vinegar to 3 parts water). After the use of either solution, rinse the pieces well with  water and shake off the excess water. Again, allow the pieces to dry on a clean towel. You may also place the bottle tubing  and cap in the microwave for 1.5 - 2 minutes.  § The Sinus Rinse bottle is not cleaned well using the .  § Replace the Sinus Rinse bottle every 3 months or if it becomes discolored.  Cleaning the Bulb Syringe (dropper, syringe or nasal spray bottle)  § After each use (which may be several times a day) fill the bulb syringe with water (described above), swish the water around,  and empty  "the bulb syringe completely. Always suspend the bulb syringe tip-down in a clean glass to allow the bulb syringe  to drain completely. Do not allow the bulb tip to sit in a puddle of water.  § In addition to rinsing the bulb after each use, clean the bulb daily with rubbing (70 percent isopropyl) alcohol. Draw the  rubbing alcohol into the bulb syringe. Swish the liquid around, and empty the bulb syringe. Again, suspend the bulb syringe  tip-down in a clean glass to allow it to drain completely.  § If you have any questions about these nasal wash techniques please ask your health care provider. Your health care provider  can discuss which technique is best for you.    If You Use a Nasal Steroid Spray    § You should always use this irrigation mixture before you use your nasal steroid spray (i.e., Flonase, Nasacort, Nasonex,  Rhinocort, etc). These steroids work better when sprayed onto nasal membranes that have been cleaned and decongested by  the saline irrigation.  § Steroid sprays also work better if you direct the spray up and out towards the outer part of the nose.      NASAL STEROID SPRAYS    General Information     These sprays, unlike decongestants, do not give immediate relief. In general, it takes up to two weeks to achieve full effect.   It is important to use the spray regularly (not on a \"as needed\" basis) since missing doses will decrease the effectiveness.   Improves mucous membrane swelling in the nose from allergy, sinusitis, nasal polyps, and other non-specific causes without  thickening nasal secretions.   Some of these preparations are FDA approved for use in children (see below). Although not FDA approved, clinically we have  significant experience in safe use in even younger children if used as directed.   Your nose should be examined at least yearly by a physician when taking this medication for long periods of time.   After the desired control is obtained, it is very important to reduce the " maintenance dose to the smallest amount necessary to  control your symptoms while minimizing complications.      Brand Name Recommended Adult Starting Dose Age Approved    Flonase® Nasal Spray (generics available) 2 sprays each nostril once daily 3 & up  Dymista® Nasal Spray* 1 spray each nostril twice daily 12 & up  Nasacort AQ® Nasal Spray 2 sprays each nostril once daily 6 & up  Nasarel® Nasal Spray 2 sprays each nostril 2 times daily 6 & up  Nasonex® Nasal Spray 2 sprays each nostril once daily 2 & up  Rhinocort Aqua® Nasal Spray 1 spray each nostril once daily 6 & up  Omnaris® Nasal Spray 2 spray each nostril once daily 6 & up  QNasl® Nasal Aerosol 2 puffs each nostril once daily 12 & up  Veramyst® Aqua Nasal Spray 2 sprays each nostril once daily 2 & up  Zetonna® Nasal Aerosol 1 puff each nostril once daily 12 & up  *Combination of azelastine hydrochloride and fluticasone propionate    Directions for Use     Shake the canister or bottle before each use. Read the enclosed instructions with each medication.   Best if used after clearing your nasal secretions (with nasal saline irrigations, nasal saline sprays, blowing your nose, etc.). These  steroids work better when sprayed onto nasal membranes that have been cleaned and decongested by the saline irrigation.   Direct the spray towards the outer part of your nostril. Directing the spray towards the center of the nose cavity (nasal  septum) increases the likelihood of bleeding, crusting and complications. Hold your breath when activating the dose.    Side Effects, and Concerns     Nose or throat irritation, cough, and headaches.   Nosebleeds, especially when sprayed toward the middle of the nose. If nosebleeds occur, discontinue the spray since continued  use risks forming a septal perforation.   Increased susceptibility of glaucoma in older adults.   Intranasal steroids have been in use since the 1970’s, and have been one of the safest and most effective drugs  marketed to treat  nasal disorders. New generation nasal steroid sprays have up to a 100-fold decrease in bioavailability (i.e., theoretically safer)  compared with older generation nasal steroids. Even these older generation nasal steroids were nearly complication free.   Theoretically, it may reduce the rate of growth in children, yet this has never been found despite decades of use and extensive  study.   The incidence of corticosteroid complications is directly proportional to lifetime absorbed dose. Minimize your risk by reducing  the dose as soon as relief is obtained and use them only during seasons when you have maximum symptoms.   People who are not using corticosteroids in any other form (oral, pulmonary, injection) except via the nasal route are already  using very low doses and thus have a very low risk.  Contraindications   Patients with systemic fungal infections, tuberculosis, ocular herpes, nosebleeds, and recent exposure to chickenpox or measles  should not use this medication.

## 2024-03-25 NOTE — PROGRESS NOTES
Subjective:   Jazmin Schroeder is a very pleasant 64 y.o. female who presents for:    Chief Complaint   Patient presents with    Sinus Problem     Patient is here for some sinus congestion that has not improved. Patient was seen 03/13/2024 and was advided to        HPI:    Sinus Problem  Episode onset: The patient was seen in the  on 3/13 for sinus congestion. She completed a prescription for doxycycline, but feels that her sinus pressure is worsening. There has been no fever. Associated symptoms include congestion. Pertinent negatives include no chills, coughing, diaphoresis, ear pain, headaches, hoarse voice, neck pain, shortness of breath, sinus pressure, sneezing, sore throat or swollen glands. (Continued sinus pressure in all sinus cavities. Occasional cough (improved). No wheezing. Mild dental pain at site of extraction, no swelling, drainage) Treatments tried: completed prescription for doxycycline five days ago, Claritin x 5 days, Mucinex. The treatment provided mild relief.     Recent root canal at the end of February. Completed a prescription for Clindamycin one month prior after dental surgery.     ROS:    Review of Systems   Constitutional:  Negative for chills, diaphoresis and fever.   HENT:  Positive for congestion and sinus pain. Negative for ear pain, hoarse voice, sinus pressure, sneezing and sore throat.    Respiratory:  Negative for cough and shortness of breath.    Musculoskeletal:  Negative for neck pain.   Neurological:  Negative for headaches.   All other systems reviewed and are negative.      Medications:      Current Outpatient Medications   Medication Sig    clindamycin (CLEOCIN) 300 MG Cap 1 CAP BY MOUTH EVERY 6 HOURS UNTIL GONE. START AFTER DENTAL SURGERY.    dexamethasone (DECADRON) 4 MG Tab TAKE 1 TABLET BY MOUTH TWICE A DAY FOR 5 DAYS UNTIL GONE    HYDROcodone-acetaminophen (NORCO) 5-325 MG Tab per tablet     albuterol 108 (90 Base) MCG/ACT Aero Soln inhalation aerosol Inhale 2  Puffs every 6 hours as needed for Shortness of Breath.       Allergies:     Allergies   Allergen Reactions    Aspirin Itching    Dexamethasone Swelling     Swelling/    Epinephrine Anaphylaxis    Penicillin G Shortness of Breath and Palpitations       Problem List:     There is no problem list on file for this patient.      Surgical History:    No past surgical history on file.    Past Social Hx:     Social History     Socioeconomic History    Marital status: Unknown   Tobacco Use    Smoking status: Never    Smokeless tobacco: Never   Vaping Use    Vaping Use: Never used   Substance and Sexual Activity    Alcohol use: Yes     Comment: occ    Drug use: Never        Past Family Hx:      History reviewed. No pertinent family history.    Problem list, medications, and allergies reviewed by myself today in Epic.     Objective:     Vitals:    03/25/24 1025   BP: 126/80   Pulse: 84   Resp: 18   Temp: 36.4 °C (97.5 °F)   SpO2: 95%       Physical Exam  Vitals reviewed.   Constitutional:       General: She is not in acute distress.     Appearance: Normal appearance. She is not ill-appearing, toxic-appearing or diaphoretic.   HENT:      Head: Normocephalic and atraumatic.      Right Ear: Tympanic membrane, ear canal and external ear normal.      Left Ear: Tympanic membrane, ear canal and external ear normal.      Nose: Congestion present. No rhinorrhea.      Right Sinus: Maxillary sinus tenderness present. No frontal sinus tenderness.      Left Sinus: No maxillary sinus tenderness or frontal sinus tenderness.      Mouth/Throat:      Mouth: Mucous membranes are dry.      Pharynx: No oropharyngeal exudate or posterior oropharyngeal erythema.   Eyes:      Extraocular Movements: Extraocular movements intact.      Conjunctiva/sclera: Conjunctivae normal.      Pupils: Pupils are equal, round, and reactive to light.   Cardiovascular:      Rate and Rhythm: Normal rate and regular rhythm.   Pulmonary:      Effort: Pulmonary effort is  normal.   Musculoskeletal:         General: Normal range of motion.      Cervical back: Normal range of motion.   Skin:     General: Skin is warm and dry.   Neurological:      General: No focal deficit present.      Mental Status: She is alert and oriented to person, place, and time.   Psychiatric:         Mood and Affect: Mood normal.         Behavior: Behavior normal.         Thought Content: Thought content normal.         Assessment/Plan:     Diagnosis and associated orders:     1. Acute bacterial rhinosinusitis  - cefdinir (OMNICEF) 300 MG Cap; Take 1 Capsule by mouth 2 times a day for 10 days.  Dispense: 20 Capsule; Refill: 0        Comments/MDM:     Patient meets Infectious Disease Society of [place] (IDSA) guidelines for ABRS given duration of symptoms, worsening severity, clinical history and physical exam.  Prescription for cefdinir sent to patient's preferred pharmacy for the treatment of acute bacterial rhinosinusitis.  Encouraged patient to complete the entire course of antibiotics  Recommend symptomatic care:    OTC second generation antihistamine daily (cetirizine, desloratadine, fexofenadine, levocetirizine, and loratadine) daily IN COMBINATION WITH:  OTC decongestant (Sudafed - Pseudoephedrine) unless contraindication in place, such as hypertension, CAD, narrow-angle glaucoma. Use with caution if the patient has a history of cardiac dysrhythmias, hyperthyroidism, DM, prostatic hypertrophy, and glaucoma should use with caution.  May take Coricidin HBP for individuals with high blood pressure.  Intranasal fluticasone (Flonase) daily    Nasal saline rinses 2-3 times a day   May use short term nasal sprays, such as oxymetazoline (Afrin) to help relieve nasal discomfort, congestion, and/or pressure. Decongestant sprays should not be used longer than three consecutive days.   Nasal rinsing with saline nasal spray or salt water (e.g., neti pot) can help relieve nasal dryness.  Breathe Right nasal strips at  night for nasal congestion  Ponaris nasal emmollient for nasal congestion, dryness, and inflammation (do not use with iodine sensitivity)  Cool mist humidification, chest rubs, warm tea with honey, increased fluid intake to thin secretions  Tylenol or ibuprofen as needed for fever control, body aches, and headaches.    If symptoms fail to improve within 72 hours, new symptoms develop, symptoms worsen return to clinic or see PCP for re-evaluation.            All questions answered. Patient verbalized understanding and is in agreement with this plan of care.     If symptoms are worsening or not improving in 3-5 days, follow-up with PCP or return to UC. Differential diagnosis, natural history, and supportive care discussed. AVS handout given and reviewed with patient. Patient educated on red flags and when to seek treatment back in ED or UC.     I personally reviewed prior external notes and test results pertinent to today's visit.  I have independently reviewed and interpreted all diagnostics ordered during this urgent care visit.     This dictation has been created using voice recognition software. The accuracy of the dictation is limited by the abilities of the software. I expect there may be some errors of grammar and possibly content. I made every attempt to manually correct the errors within my dictation. However, errors related to voice recognition software may still exist and should be interpreted within the appropriate context.    This note was electronically signed by JOÃO Aguilera

## 2024-07-16 ENCOUNTER — OFFICE VISIT (OUTPATIENT)
Dept: URGENT CARE | Facility: CLINIC | Age: 64
End: 2024-07-16
Payer: MEDICARE

## 2024-07-16 VITALS
WEIGHT: 181.6 LBS | OXYGEN SATURATION: 96 % | HEIGHT: 61 IN | TEMPERATURE: 96.6 F | SYSTOLIC BLOOD PRESSURE: 142 MMHG | DIASTOLIC BLOOD PRESSURE: 92 MMHG | RESPIRATION RATE: 18 BRPM | HEART RATE: 95 BPM | BODY MASS INDEX: 34.29 KG/M2

## 2024-07-16 DIAGNOSIS — R35.0 URINARY FREQUENCY: ICD-10-CM

## 2024-07-16 DIAGNOSIS — R30.0 DYSURIA: ICD-10-CM

## 2024-07-16 DIAGNOSIS — R03.0 ELEVATED BLOOD PRESSURE READING WITHOUT DIAGNOSIS OF HYPERTENSION: ICD-10-CM

## 2024-07-16 DIAGNOSIS — N39.0 ACUTE LOWER UTI: ICD-10-CM

## 2024-07-16 LAB
APPEARANCE UR: CLEAR
BILIRUB UR STRIP-MCNC: NEGATIVE MG/DL
COLOR UR AUTO: YELLOW
GLUCOSE UR STRIP.AUTO-MCNC: NEGATIVE MG/DL
KETONES UR STRIP.AUTO-MCNC: NEGATIVE MG/DL
LEUKOCYTE ESTERASE UR QL STRIP.AUTO: NORMAL
NITRITE UR QL STRIP.AUTO: POSITIVE
PH UR STRIP.AUTO: 5.5 [PH] (ref 5–8)
PROT UR QL STRIP: 30 MG/DL
RBC UR QL AUTO: NORMAL
SP GR UR STRIP.AUTO: >=1.03
UROBILINOGEN UR STRIP-MCNC: 0.2 MG/DL

## 2024-07-16 PROCEDURE — 81002 URINALYSIS NONAUTO W/O SCOPE: CPT | Performed by: PHYSICIAN ASSISTANT

## 2024-07-16 PROCEDURE — 99213 OFFICE O/P EST LOW 20 MIN: CPT | Performed by: PHYSICIAN ASSISTANT

## 2024-07-16 PROCEDURE — 3080F DIAST BP >= 90 MM HG: CPT | Performed by: PHYSICIAN ASSISTANT

## 2024-07-16 PROCEDURE — 3077F SYST BP >= 140 MM HG: CPT | Performed by: PHYSICIAN ASSISTANT

## 2024-07-16 RX ORDER — IBUPROFEN 200 MG
200 TABLET ORAL EVERY 6 HOURS PRN
COMMUNITY

## 2024-07-16 RX ORDER — PHENAZOPYRIDINE HYDROCHLORIDE 200 MG/1
200 TABLET, FILM COATED ORAL 3 TIMES DAILY PRN
Qty: 6 TABLET | Refills: 0 | Status: SHIPPED | OUTPATIENT
Start: 2024-07-16

## 2024-07-16 RX ORDER — SULFAMETHOXAZOLE AND TRIMETHOPRIM 800; 160 MG/1; MG/1
1 TABLET ORAL 2 TIMES DAILY
Qty: 14 TABLET | Refills: 0 | Status: SHIPPED | OUTPATIENT
Start: 2024-07-16 | End: 2024-07-23

## 2024-07-16 RX ORDER — GLUCOSAMINE/D3/BOSWELLIA SERRA 1500MG-400
1 TABLET ORAL
COMMUNITY

## 2024-07-16 ASSESSMENT — ENCOUNTER SYMPTOMS
CHANGE IN BOWEL HABIT: 0
HEADACHES: 1
FLANK PAIN: 1
FEVER: 1

## 2024-09-09 ENCOUNTER — HOSPITAL ENCOUNTER (OUTPATIENT)
Dept: RADIOLOGY | Facility: MEDICAL CENTER | Age: 64
End: 2024-09-09
Attending: CHIROPRACTOR
Payer: MEDICARE

## 2024-09-09 DIAGNOSIS — M54.50 LUMBAR PAIN: ICD-10-CM

## 2024-09-09 PROCEDURE — 72170 X-RAY EXAM OF PELVIS: CPT

## 2024-09-09 PROCEDURE — 72100 X-RAY EXAM L-S SPINE 2/3 VWS: CPT

## 2024-09-12 ENCOUNTER — APPOINTMENT (OUTPATIENT)
Dept: LAB | Facility: MEDICAL CENTER | Age: 64
End: 2024-09-12
Payer: MEDICARE

## 2024-09-12 ENCOUNTER — OFFICE VISIT (OUTPATIENT)
Dept: URGENT CARE | Facility: CLINIC | Age: 64
End: 2024-09-12
Payer: MEDICARE

## 2024-09-12 ENCOUNTER — HOSPITAL ENCOUNTER (OUTPATIENT)
Facility: MEDICAL CENTER | Age: 64
End: 2024-09-12
Attending: PHYSICIAN ASSISTANT
Payer: MEDICARE

## 2024-09-12 VITALS
BODY MASS INDEX: 30.16 KG/M2 | OXYGEN SATURATION: 97 % | HEART RATE: 87 BPM | RESPIRATION RATE: 14 BRPM | TEMPERATURE: 97.3 F | WEIGHT: 181 LBS | DIASTOLIC BLOOD PRESSURE: 102 MMHG | HEIGHT: 65 IN | SYSTOLIC BLOOD PRESSURE: 166 MMHG

## 2024-09-12 DIAGNOSIS — R35.89 POLYURIA: ICD-10-CM

## 2024-09-12 DIAGNOSIS — M54.50 LOW BACK PAIN, UNSPECIFIED BACK PAIN LATERALITY, UNSPECIFIED CHRONICITY, UNSPECIFIED WHETHER SCIATICA PRESENT: ICD-10-CM

## 2024-09-12 DIAGNOSIS — I10 HYPERTENSION, UNSPECIFIED TYPE: ICD-10-CM

## 2024-09-12 DIAGNOSIS — M25.551 RIGHT HIP PAIN: ICD-10-CM

## 2024-09-12 LAB
APPEARANCE UR: NORMAL
BILIRUB UR STRIP-MCNC: NEGATIVE MG/DL
COLOR UR AUTO: YELLOW
GLUCOSE UR STRIP.AUTO-MCNC: NEGATIVE MG/DL
KETONES UR STRIP.AUTO-MCNC: NEGATIVE MG/DL
LEUKOCYTE ESTERASE UR QL STRIP.AUTO: NEGATIVE
NITRITE UR QL STRIP.AUTO: NEGATIVE
PH UR STRIP.AUTO: 5.5 [PH] (ref 5–8)
PROT UR QL STRIP: NEGATIVE MG/DL
RBC UR QL AUTO: NEGATIVE
SP GR UR STRIP.AUTO: >=1.03
UROBILINOGEN UR STRIP-MCNC: NORMAL MG/DL

## 2024-09-12 PROCEDURE — 3080F DIAST BP >= 90 MM HG: CPT | Performed by: PHYSICIAN ASSISTANT

## 2024-09-12 PROCEDURE — 3077F SYST BP >= 140 MM HG: CPT | Performed by: PHYSICIAN ASSISTANT

## 2024-09-12 PROCEDURE — 87086 URINE CULTURE/COLONY COUNT: CPT

## 2024-09-12 PROCEDURE — 99213 OFFICE O/P EST LOW 20 MIN: CPT | Performed by: PHYSICIAN ASSISTANT

## 2024-09-12 PROCEDURE — 81002 URINALYSIS NONAUTO W/O SCOPE: CPT | Performed by: PHYSICIAN ASSISTANT

## 2024-09-12 ASSESSMENT — ENCOUNTER SYMPTOMS
FLANK PAIN: 0
HEADACHES: 0
DIZZINESS: 0
VOMITING: 0
CHILLS: 0
FEVER: 0
DIARRHEA: 0
ABDOMINAL PAIN: 0
BACK PAIN: 1
PALPITATIONS: 0

## 2024-09-12 NOTE — PROGRESS NOTES
Subjective:   Jazmin Schroeder  is a 64 y.o. female who presents for UTI (X 4 days/ Back pain/ frequency in urination/ pressure on bladder)      UTI  This is a new problem. The current episode started in the past 7 days. Pertinent negatives include no abdominal pain, chest pain, chills, fever, headaches or vomiting.   Patient presents urgent care with subtle symptoms she thinks may be attributed to urinary infection.  She has a recent history of increased back pain and increased right hip pain.  She is seeing orthopedics for this this coming Monday.  She is planning surgical treatment for this.  She has had urinary tract infections in the past.  She has noted some increased urinary frequency.  She notes some slight suprapubic pressure.  She states this is slightly more on right side which is consistent with her hip pain that can radiate anteriorly on right side.  She denies significant dysuria.  She denies abnormal vaginal discharge.  She denies hematuria.  She denies concern for STIs.  She notes low back pain consistent with her musculoskeletal back pain but denies flank pain.  She presents urgent care requesting urinalysis to confirm no urinary infection.    She is found to be hypertensive in urgent care.  She denies headaches dizziness visual changes chest pain palpitations or shortness of breath.  She states she has had significant increase in orthopedic pain and has been taking much more anti-inflammatories over-the-counter.  She intends to log blood pressure values and follow-up with PCP.    Review of Systems   Constitutional:  Negative for chills and fever.   Cardiovascular:  Negative for chest pain and palpitations.   Gastrointestinal:  Negative for abdominal pain, diarrhea and vomiting.   Genitourinary:  Positive for dysuria and frequency. Negative for flank pain and hematuria.   Musculoskeletal:  Positive for back pain.   Neurological:  Negative for dizziness and headaches.       Allergies   Allergen  "Reactions    Aspirin Itching    Dexamethasone Swelling     Swelling/    Epinephrine Anaphylaxis    Penicillin G Shortness of Breath and Palpitations        Objective:   BP (!) 166/102 (BP Location: Left arm, Patient Position: Standing, BP Cuff Size: Adult long)   Pulse 87   Temp 36.3 °C (97.3 °F)   Resp 14   Ht 1.651 m (5' 5\")   Wt 82.1 kg (181 lb)   SpO2 97%   BMI 30.12 kg/m²     Physical Exam  Vitals and nursing note reviewed.   Constitutional:       General: She is not in acute distress.     Appearance: She is well-developed. She is not diaphoretic.   HENT:      Head: Normocephalic and atraumatic.      Right Ear: External ear normal.      Left Ear: External ear normal.      Nose: Nose normal.   Eyes:      General: Lids are normal. No scleral icterus.        Right eye: No discharge.         Left eye: No discharge.      Conjunctiva/sclera: Conjunctivae normal.   Pulmonary:      Effort: Pulmonary effort is normal. No respiratory distress.   Abdominal:      Tenderness: There is no right CVA tenderness or left CVA tenderness.   Musculoskeletal:         General: Normal range of motion.      Cervical back: Neck supple.   Skin:     General: Skin is warm and dry.      Coloration: Skin is not pale.      Findings: No erythema.   Neurological:      Mental Status: She is alert and oriented to person, place, and time. She is not disoriented.   Psychiatric:         Speech: Speech normal.         Behavior: Behavior normal.     Point-of-care urinalysis is negative/normal (see chart)  Urine culture is pending    Assessment/Plan:   1. UTI symptoms  - POCT Urinalysis    2. Polyuria    3. Dysuria    4. Hypertension, unspecified type  Based on urinalysis and vague symptoms I do not suspect acute cystitis at this time.  Urine culture will confirm.  Patient is encouraged to stay hydrated.  Follow-up with orthopedics next week.  Keep log of blood pressure values and follow-up with PCP if remains elevated.  Return to clinic with " lack of resolution or progression of symptoms.      I have worn an N95 mask, gloves and eye protection for the entire encounter with this patient.     Differential diagnosis, natural history, supportive care, and indications for immediate follow-up discussed.

## 2024-09-15 LAB
BACTERIA UR CULT: NORMAL
SIGNIFICANT IND 70042: NORMAL
SITE SITE: NORMAL
SOURCE SOURCE: NORMAL

## 2024-10-03 ENCOUNTER — PATIENT MESSAGE (OUTPATIENT)
Dept: HEALTH INFORMATION MANAGEMENT | Facility: OTHER | Age: 64
End: 2024-10-03

## 2024-10-10 ENCOUNTER — PATIENT MESSAGE (OUTPATIENT)
Dept: HEALTH INFORMATION MANAGEMENT | Facility: OTHER | Age: 64
End: 2024-10-10

## 2024-11-21 ENCOUNTER — OFFICE VISIT (OUTPATIENT)
Dept: URGENT CARE | Facility: CLINIC | Age: 64
End: 2024-11-21
Payer: MEDICARE

## 2024-11-21 VITALS
OXYGEN SATURATION: 98 % | HEIGHT: 61 IN | DIASTOLIC BLOOD PRESSURE: 88 MMHG | SYSTOLIC BLOOD PRESSURE: 122 MMHG | BODY MASS INDEX: 33.04 KG/M2 | WEIGHT: 175 LBS | HEART RATE: 78 BPM | RESPIRATION RATE: 16 BRPM | TEMPERATURE: 97.9 F

## 2024-11-21 DIAGNOSIS — J06.9 VIRAL URI: ICD-10-CM

## 2024-11-21 DIAGNOSIS — J02.9 PHARYNGITIS, UNSPECIFIED ETIOLOGY: ICD-10-CM

## 2024-11-21 LAB — S PYO DNA SPEC NAA+PROBE: NOT DETECTED

## 2024-11-21 PROCEDURE — 3079F DIAST BP 80-89 MM HG: CPT

## 2024-11-21 PROCEDURE — 87651 STREP A DNA AMP PROBE: CPT

## 2024-11-21 PROCEDURE — 99213 OFFICE O/P EST LOW 20 MIN: CPT

## 2024-11-21 PROCEDURE — 3074F SYST BP LT 130 MM HG: CPT

## 2024-11-21 RX ORDER — FLUTICASONE PROPIONATE 50 MCG
1 SPRAY, SUSPENSION (ML) NASAL DAILY
COMMUNITY

## 2024-11-21 RX ORDER — IBUPROFEN 200 MG
200 TABLET ORAL EVERY 6 HOURS PRN
COMMUNITY

## 2024-11-22 NOTE — PROGRESS NOTES
Verbal consent was acquired by the patient to use [employer] ambient listening note generation during this visit   Subjective:   Jazmin Schroeder is a 64 y.o. female who presents for Pharyngitis (Exposed to Strep)      HPI:  History of Present Illness  The patient is a 64-year-old female who presents for evaluation of a sore throat.    She has been experiencing a sore throat and painful swallowing for the past 2 days. She reports exposure to strep from her granddaughter. She has been using Flonase for mild congestion and takes ibuprofen every morning for other issues. She also takes generic sinus medication.    She has been experiencing intermittent headaches and stomach cramps for the past 2 days. She reports no fevers, chills, or body aches.         Review of Systems   Constitutional:  Negative for fever.   HENT:  Positive for congestion and sore throat.        Medications:    Current Outpatient Medications on File Prior to Visit   Medication Sig Dispense Refill    fluticasone (FLONASE) 50 MCG/ACT nasal spray Administer 1 Spray into affected nostril(S) every day.      ibuprofen (MOTRIN) 200 MG Tab Take 200 mg by mouth every 6 hours as needed.      ibuprofen (MOTRIN) 200 MG Tab Take 200 mg by mouth every 6 hours as needed. (Patient not taking: Reported on 11/21/2024)      Biotin 54877 MCG Tab Take 1 Capsule by mouth 1 time a day as needed. (Patient not taking: Reported on 11/21/2024)      phenazopyridine (PYRIDIUM) 200 MG Tab Take 1 Tablet by mouth 3 times a day as needed for Moderate Pain. (Patient not taking: Reported on 11/21/2024) 6 Tablet 0    HYDROcodone-acetaminophen (NORCO) 5-325 MG Tab per tablet  (Patient not taking: Reported on 11/21/2024)      albuterol 108 (90 Base) MCG/ACT Aero Soln inhalation aerosol Inhale 2 Puffs every 6 hours as needed for Shortness of Breath. (Patient not taking: Reported on 11/21/2024) 8.5 g 0     No current facility-administered medications on file prior to visit.     "    Allergies:   Aspirin, Dexamethasone, Epinephrine, and Penicillin g    Problem List:   There is no problem list on file for this patient.       Surgical History:  No past surgical history on file.    Past Social Hx:   Social History     Tobacco Use    Smoking status: Never    Smokeless tobacco: Never   Vaping Use    Vaping status: Never Used   Substance Use Topics    Alcohol use: Yes     Comment: occ    Drug use: Never          Problem list, medications, and allergies reviewed by myself today in Epic.     Objective:     /88   Pulse 78   Temp 36.6 °C (97.9 °F) (Temporal)   Resp 16   Ht 1.549 m (5' 1\")   Wt 79.4 kg (175 lb)   SpO2 98%   BMI 33.07 kg/m²     Physical Exam  Vitals and nursing note reviewed.   Constitutional:       General: She is not in acute distress.     Appearance: Normal appearance. She is normal weight. She is not ill-appearing, toxic-appearing or diaphoretic.   HENT:      Head: Normocephalic and atraumatic.      Right Ear: Tympanic membrane, ear canal and external ear normal. There is no impacted cerumen.      Left Ear: Tympanic membrane, ear canal and external ear normal. There is no impacted cerumen.      Nose: Congestion present. No rhinorrhea.      Mouth/Throat:      Mouth: Mucous membranes are moist.      Pharynx: Oropharynx is clear. Posterior oropharyngeal erythema present. No oropharyngeal exudate.   Cardiovascular:      Rate and Rhythm: Normal rate and regular rhythm.      Pulses: Normal pulses.      Heart sounds: Normal heart sounds. No murmur heard.     No friction rub. No gallop.   Pulmonary:      Effort: Pulmonary effort is normal. No respiratory distress.      Breath sounds: Normal breath sounds. No stridor. No wheezing, rhonchi or rales.   Chest:      Chest wall: No tenderness.   Musculoskeletal:      Cervical back: Neck supple. No tenderness.   Lymphadenopathy:      Cervical: No cervical adenopathy.   Skin:     General: Skin is warm and dry.      Capillary Refill: " Capillary refill takes less than 2 seconds.   Neurological:      General: No focal deficit present.      Mental Status: She is alert and oriented to person, place, and time. Mental status is at baseline.      Cranial Nerves: No cranial nerve deficit.      Motor: No weakness.      Gait: Gait normal.   Psychiatric:         Mood and Affect: Mood normal.         Behavior: Behavior normal.         Thought Content: Thought content normal.         Judgment: Judgment normal.         Assessment/Plan:     Diagnosis and associated orders:   1. Pharyngitis, unspecified etiology  POCT GROUP A STREP, PCR      2. Viral URI           Results for orders placed or performed in visit on 11/21/24   POCT GROUP A STREP, PCR    Collection Time: 11/21/24  4:55 PM   Result Value Ref Range    POC Group A Strep, PCR Not Detected Not Detected, Invalid          Comments/MDM:   Pt is clinically stable at today's acute urgent care visit.  No acute distress noted. Appropriate for outpatient management at this time.     Assessment & Plan  Strep testing is negative.  She was advised to continue using Flonase, perform warm salt water gargles, and take Tylenol and ibuprofen. An antihistamine such as Claritin was recommended for a few days to help dry up the fluid. Increased oral hydration advised.                Discussed DDx, management options (risks,benefits, and alternatives to planned treatment), natural progression and supportive care.  Expressed understanding and the treatment plan was agreed upon. Questions were encouraged and answered   Return to urgent care prn if new or worsening sx or if there is no improvement in condition prn.    Educated in Red flags and indications to immediately call 911 or present to the Emergency Department.   Advised the patient to follow-up with the primary care physician for recheck, reevaluation, and consideration of further management.    I personally reviewed prior external notes and test results pertinent to  today's visit.  I have independently reviewed and interpreted all diagnostics ordered during this urgent care acute visit.     Please note that this dictation was created using voice recognition software. I have made a reasonable attempt to correct obvious errors, but I expect that there are errors of grammar and possibly content that I did not discover before finalizing the note.    This note was electronically signed by MAGNOLIA Valadez

## 2024-11-25 ASSESSMENT — ENCOUNTER SYMPTOMS
SORE THROAT: 1
FEVER: 0

## 2025-08-01 ENCOUNTER — OFFICE VISIT (OUTPATIENT)
Dept: URGENT CARE | Facility: CLINIC | Age: 65
End: 2025-08-01
Payer: MEDICARE

## 2025-08-01 VITALS
SYSTOLIC BLOOD PRESSURE: 146 MMHG | RESPIRATION RATE: 16 BRPM | HEIGHT: 61 IN | DIASTOLIC BLOOD PRESSURE: 90 MMHG | OXYGEN SATURATION: 96 % | HEART RATE: 73 BPM | BODY MASS INDEX: 34.55 KG/M2 | WEIGHT: 183 LBS | TEMPERATURE: 97.3 F

## 2025-08-01 DIAGNOSIS — M62.838 MUSCLE SPASM: Primary | ICD-10-CM

## 2025-08-01 PROCEDURE — 3080F DIAST BP >= 90 MM HG: CPT | Performed by: STUDENT IN AN ORGANIZED HEALTH CARE EDUCATION/TRAINING PROGRAM

## 2025-08-01 PROCEDURE — 3077F SYST BP >= 140 MM HG: CPT | Performed by: STUDENT IN AN ORGANIZED HEALTH CARE EDUCATION/TRAINING PROGRAM

## 2025-08-01 PROCEDURE — 99213 OFFICE O/P EST LOW 20 MIN: CPT | Performed by: STUDENT IN AN ORGANIZED HEALTH CARE EDUCATION/TRAINING PROGRAM

## 2025-08-11 ENCOUNTER — TELEPHONE (OUTPATIENT)
Facility: MEDICAL CENTER | Age: 65
End: 2025-08-11
Payer: MEDICARE

## 2025-08-18 ENCOUNTER — APPOINTMENT (OUTPATIENT)
Dept: LAB | Facility: MEDICAL CENTER | Age: 65
End: 2025-08-18
Payer: MEDICARE

## 2025-08-25 ENCOUNTER — HOSPITAL ENCOUNTER (OUTPATIENT)
Facility: MEDICAL CENTER | Age: 65
End: 2025-08-25
Attending: FAMILY MEDICINE
Payer: MEDICARE

## 2025-08-25 LAB
25(OH)D3 SERPL-MCNC: 13 NG/ML (ref 30–100)
ALBUMIN SERPL BCP-MCNC: 4.1 G/DL (ref 3.2–4.9)
ALBUMIN/GLOB SERPL: 1.2 G/DL
ALP SERPL-CCNC: 89 U/L (ref 30–99)
ALT SERPL-CCNC: 20 U/L (ref 2–50)
ANION GAP SERPL CALC-SCNC: 13 MMOL/L (ref 7–16)
AST SERPL-CCNC: 26 U/L (ref 12–45)
BASOPHILS # BLD AUTO: 0.7 % (ref 0–1.8)
BASOPHILS # BLD: 0.04 K/UL (ref 0–0.12)
BILIRUB SERPL-MCNC: 0.5 MG/DL (ref 0.1–1.5)
BUN SERPL-MCNC: 18 MG/DL (ref 8–22)
CALCIUM ALBUM COR SERPL-MCNC: 9.4 MG/DL (ref 8.5–10.5)
CALCIUM SERPL-MCNC: 9.5 MG/DL (ref 8.5–10.5)
CHLORIDE SERPL-SCNC: 107 MMOL/L (ref 96–112)
CHOLEST SERPL-MCNC: 199 MG/DL (ref 100–199)
CO2 SERPL-SCNC: 22 MMOL/L (ref 20–33)
CREAT SERPL-MCNC: 0.82 MG/DL (ref 0.5–1.4)
CRP SERPL HS-MCNC: 0.39 MG/DL (ref 0–0.75)
EOSINOPHIL # BLD AUTO: 0.21 K/UL (ref 0–0.51)
EOSINOPHIL NFR BLD: 3.5 % (ref 0–6.9)
ERYTHROCYTE [DISTWIDTH] IN BLOOD BY AUTOMATED COUNT: 41.4 FL (ref 35.9–50)
ERYTHROCYTE [SEDIMENTATION RATE] IN BLOOD BY WESTERGREN METHOD: 11 MM/HOUR (ref 0–25)
EST. AVERAGE GLUCOSE BLD GHB EST-MCNC: 131 MG/DL
ESTRADIOL SERPL-MCNC: 11.9 PG/ML
FASTING STATUS PATIENT QL REPORTED: NORMAL
FSH SERPL-ACNC: 46.5 MIU/ML
GFR SERPLBLD CREATININE-BSD FMLA CKD-EPI: 78 ML/MIN/1.73 M 2
GLOBULIN SER CALC-MCNC: 3.3 G/DL (ref 1.9–3.5)
GLUCOSE SERPL-MCNC: 108 MG/DL (ref 65–99)
HBA1C MFR BLD: 6.2 % (ref 4–5.6)
HCT VFR BLD AUTO: 44.7 % (ref 37–47)
HDLC SERPL-MCNC: 56 MG/DL
HGB BLD-MCNC: 15 G/DL (ref 12–16)
IMM GRANULOCYTES # BLD AUTO: 0.01 K/UL (ref 0–0.11)
IMM GRANULOCYTES NFR BLD AUTO: 0.2 % (ref 0–0.9)
LDLC SERPL CALC-MCNC: 125 MG/DL
LH SERPL-ACNC: 25.9 IU/L
LYMPHOCYTES # BLD AUTO: 1.63 K/UL (ref 1–4.8)
LYMPHOCYTES NFR BLD: 27.2 % (ref 22–41)
MCH RBC QN AUTO: 30.5 PG (ref 27–33)
MCHC RBC AUTO-ENTMCNC: 33.6 G/DL (ref 32.2–35.5)
MCV RBC AUTO: 91 FL (ref 81.4–97.8)
MONOCYTES # BLD AUTO: 0.33 K/UL (ref 0–0.85)
MONOCYTES NFR BLD AUTO: 5.5 % (ref 0–13.4)
NEUTROPHILS # BLD AUTO: 3.77 K/UL (ref 1.82–7.42)
NEUTROPHILS NFR BLD: 62.9 % (ref 44–72)
NRBC # BLD AUTO: 0 K/UL
NRBC BLD-RTO: 0 /100 WBC (ref 0–0.2)
PLATELET # BLD AUTO: 249 K/UL (ref 164–446)
PMV BLD AUTO: 10.1 FL (ref 9–12.9)
POTASSIUM SERPL-SCNC: 3.8 MMOL/L (ref 3.6–5.5)
PROGEST SERPL-MCNC: 0.2 NG/ML
PROT SERPL-MCNC: 7.4 G/DL (ref 6–8.2)
RBC # BLD AUTO: 4.91 M/UL (ref 4.2–5.4)
RHEUMATOID FACT SER IA-ACNC: <10 IU/ML (ref 0–14)
SODIUM SERPL-SCNC: 142 MMOL/L (ref 135–145)
T3FREE SERPL-MCNC: 3.16 PG/ML (ref 2–4.4)
T4 FREE SERPL-MCNC: 1.18 NG/DL (ref 0.93–1.7)
TRIGL SERPL-MCNC: 90 MG/DL (ref 0–149)
TSH SERPL DL<=0.005 MIU/L-ACNC: 1.39 UIU/ML (ref 0.38–5.33)
URATE SERPL-MCNC: 6.8 MG/DL (ref 1.9–8.2)
WBC # BLD AUTO: 6 K/UL (ref 4.8–10.8)

## 2025-08-25 PROCEDURE — 84403 ASSAY OF TOTAL TESTOSTERONE: CPT

## 2025-08-25 PROCEDURE — 36415 COLL VENOUS BLD VENIPUNCTURE: CPT

## 2025-08-25 PROCEDURE — 84550 ASSAY OF BLOOD/URIC ACID: CPT

## 2025-08-25 PROCEDURE — 80053 COMPREHEN METABOLIC PANEL: CPT

## 2025-08-25 PROCEDURE — 83001 ASSAY OF GONADOTROPIN (FSH): CPT

## 2025-08-25 PROCEDURE — 83002 ASSAY OF GONADOTROPIN (LH): CPT

## 2025-08-25 PROCEDURE — 86800 THYROGLOBULIN ANTIBODY: CPT

## 2025-08-25 PROCEDURE — 84443 ASSAY THYROID STIM HORMONE: CPT

## 2025-08-25 PROCEDURE — 84481 FREE ASSAY (FT-3): CPT

## 2025-08-25 PROCEDURE — 82306 VITAMIN D 25 HYDROXY: CPT

## 2025-08-25 PROCEDURE — 86200 CCP ANTIBODY: CPT

## 2025-08-25 PROCEDURE — 86431 RHEUMATOID FACTOR QUANT: CPT

## 2025-08-25 PROCEDURE — 85652 RBC SED RATE AUTOMATED: CPT

## 2025-08-25 PROCEDURE — 80061 LIPID PANEL: CPT

## 2025-08-25 PROCEDURE — 84439 ASSAY OF FREE THYROXINE: CPT

## 2025-08-25 PROCEDURE — 84144 ASSAY OF PROGESTERONE: CPT

## 2025-08-25 PROCEDURE — 85025 COMPLETE CBC W/AUTO DIFF WBC: CPT

## 2025-08-25 PROCEDURE — 82670 ASSAY OF TOTAL ESTRADIOL: CPT

## 2025-08-25 PROCEDURE — 86140 C-REACTIVE PROTEIN: CPT

## 2025-08-25 PROCEDURE — 83036 HEMOGLOBIN GLYCOSYLATED A1C: CPT

## 2025-08-26 LAB
CCP IGA+IGG SERPL IA-ACNC: 9 UNITS (ref 0–19)
THYROGLOB AB SERPL-ACNC: <1.5 IU/ML (ref 0–4)

## 2025-08-29 ENCOUNTER — APPOINTMENT (OUTPATIENT)
Dept: LAB | Facility: MEDICAL CENTER | Age: 65
End: 2025-08-29
Payer: MEDICARE

## 2025-08-29 LAB — TESTOST SERPL-MCNC: 23 NG/DL (ref 5–32)
